# Patient Record
Sex: FEMALE | Race: WHITE | Employment: UNEMPLOYED | URBAN - METROPOLITAN AREA
[De-identification: names, ages, dates, MRNs, and addresses within clinical notes are randomized per-mention and may not be internally consistent; named-entity substitution may affect disease eponyms.]

---

## 2020-08-01 ENCOUNTER — APPOINTMENT (OUTPATIENT)
Dept: GENERAL RADIOLOGY | Age: 50
End: 2020-08-01
Payer: COMMERCIAL

## 2020-08-01 ENCOUNTER — HOSPITAL ENCOUNTER (EMERGENCY)
Age: 50
Discharge: HOME | End: 2020-08-01
Attending: STUDENT IN AN ORGANIZED HEALTH CARE EDUCATION/TRAINING PROGRAM
Payer: COMMERCIAL

## 2020-08-01 VITALS
OXYGEN SATURATION: 100 % | DIASTOLIC BLOOD PRESSURE: 88 MMHG | TEMPERATURE: 99 F | BODY MASS INDEX: 30.12 KG/M2 | SYSTOLIC BLOOD PRESSURE: 122 MMHG | WEIGHT: 170 LBS | HEART RATE: 95 BPM | HEIGHT: 63 IN | RESPIRATION RATE: 19 BRPM

## 2020-08-01 LAB
A/G RATIO: 1.3 (ref 1.1–2.2)
ALBUMIN SERPL-MCNC: 4.4 G/DL (ref 3.4–5)
ALP BLD-CCNC: 108 U/L (ref 40–129)
ALT SERPL-CCNC: 27 U/L (ref 10–40)
ANION GAP SERPL CALCULATED.3IONS-SCNC: 9 MMOL/L (ref 3–16)
AST SERPL-CCNC: 26 U/L (ref 15–37)
BASOPHILS ABSOLUTE: 0.1 K/UL (ref 0–0.2)
BASOPHILS RELATIVE PERCENT: 0.9 %
BILIRUB SERPL-MCNC: <0.2 MG/DL (ref 0–1)
BILIRUBIN URINE: ABNORMAL
BLOOD, URINE: NEGATIVE
BUN BLDV-MCNC: 12 MG/DL (ref 7–20)
CALCIUM SERPL-MCNC: 10.1 MG/DL (ref 8.3–10.6)
CHLORIDE BLD-SCNC: 100 MMOL/L (ref 99–110)
CLARITY: CLEAR
CO2: 28 MMOL/L (ref 21–32)
COLOR: YELLOW
CREAT SERPL-MCNC: 0.9 MG/DL (ref 0.6–1.1)
EKG ATRIAL RATE: 87 BPM
EKG DIAGNOSIS: NORMAL
EKG P AXIS: 47 DEGREES
EKG P-R INTERVAL: 160 MS
EKG Q-T INTERVAL: 370 MS
EKG QRS DURATION: 88 MS
EKG QTC CALCULATION (BAZETT): 445 MS
EKG R AXIS: -9 DEGREES
EKG T AXIS: 26 DEGREES
EKG VENTRICULAR RATE: 87 BPM
EOSINOPHILS ABSOLUTE: 0.1 K/UL (ref 0–0.6)
EOSINOPHILS RELATIVE PERCENT: 1 %
ESTIMATED AVERAGE GLUCOSE: 105.4 MG/DL
GFR AFRICAN AMERICAN: >60
GFR NON-AFRICAN AMERICAN: >60
GLOBULIN: 3.4 G/DL
GLUCOSE BLD-MCNC: 93 MG/DL (ref 70–99)
GLUCOSE URINE: NEGATIVE MG/DL
HBA1C MFR BLD: 5.3 %
HCG QUALITATIVE: NEGATIVE
HCT VFR BLD CALC: 43.6 % (ref 36–48)
HEMOGLOBIN: 14.1 G/DL (ref 12–16)
KETONES, URINE: NEGATIVE MG/DL
LACTATE DEHYDROGENASE: 163 U/L (ref 100–190)
LEUKOCYTE ESTERASE, URINE: NEGATIVE
LYMPHOCYTES ABSOLUTE: 3.1 K/UL (ref 1–5.1)
LYMPHOCYTES RELATIVE PERCENT: 26.7 %
MCH RBC QN AUTO: 29.1 PG (ref 26–34)
MCHC RBC AUTO-ENTMCNC: 32.4 G/DL (ref 31–36)
MCV RBC AUTO: 89.8 FL (ref 80–100)
MICROSCOPIC EXAMINATION: ABNORMAL
MONOCYTES ABSOLUTE: 0.9 K/UL (ref 0–1.3)
MONOCYTES RELATIVE PERCENT: 8 %
NEUTROPHILS ABSOLUTE: 7.3 K/UL (ref 1.7–7.7)
NEUTROPHILS RELATIVE PERCENT: 63.4 %
NITRITE, URINE: NEGATIVE
PDW BLD-RTO: 14.3 % (ref 12.4–15.4)
PH UA: 5.5 (ref 5–8)
PLATELET # BLD: 340 K/UL (ref 135–450)
PMV BLD AUTO: 7.3 FL (ref 5–10.5)
POTASSIUM REFLEX MAGNESIUM: 3.9 MMOL/L (ref 3.5–5.1)
PROTEIN UA: NEGATIVE MG/DL
RBC # BLD: 4.86 M/UL (ref 4–5.2)
SODIUM BLD-SCNC: 137 MMOL/L (ref 136–145)
SPECIFIC GRAVITY UA: >=1.03 (ref 1–1.03)
T3 FREE: 2.5 PG/ML (ref 2.3–4.2)
T4 FREE: 0.6 NG/DL (ref 0.9–1.8)
TOTAL PROTEIN: 7.8 G/DL (ref 6.4–8.2)
TROPONIN: <0.01 NG/ML
TSH REFLEX: 2.55 UIU/ML (ref 0.27–4.2)
URINE REFLEX TO CULTURE: ABNORMAL
URINE TYPE: ABNORMAL
UROBILINOGEN, URINE: 0.2 E.U./DL
WBC # BLD: 11.4 K/UL (ref 4–11)

## 2020-08-01 PROCEDURE — 80053 COMPREHEN METABOLIC PANEL: CPT

## 2020-08-01 PROCEDURE — 71046 X-RAY EXAM CHEST 2 VIEWS: CPT

## 2020-08-01 PROCEDURE — 84443 ASSAY THYROID STIM HORMONE: CPT

## 2020-08-01 PROCEDURE — 93005 ELECTROCARDIOGRAM TRACING: CPT | Performed by: STUDENT IN AN ORGANIZED HEALTH CARE EDUCATION/TRAINING PROGRAM

## 2020-08-01 PROCEDURE — 83036 HEMOGLOBIN GLYCOSYLATED A1C: CPT

## 2020-08-01 PROCEDURE — 99284 EMERGENCY DEPT VISIT MOD MDM: CPT

## 2020-08-01 PROCEDURE — 84439 ASSAY OF FREE THYROXINE: CPT

## 2020-08-01 PROCEDURE — 85025 COMPLETE CBC W/AUTO DIFF WBC: CPT

## 2020-08-01 PROCEDURE — 84484 ASSAY OF TROPONIN QUANT: CPT

## 2020-08-01 PROCEDURE — U0003 INFECTIOUS AGENT DETECTION BY NUCLEIC ACID (DNA OR RNA); SEVERE ACUTE RESPIRATORY SYNDROME CORONAVIRUS 2 (SARS-COV-2) (CORONAVIRUS DISEASE [COVID-19]), AMPLIFIED PROBE TECHNIQUE, MAKING USE OF HIGH THROUGHPUT TECHNOLOGIES AS DESCRIBED BY CMS-2020-01-R: HCPCS

## 2020-08-01 PROCEDURE — 83615 LACTATE (LD) (LDH) ENZYME: CPT

## 2020-08-01 PROCEDURE — 81003 URINALYSIS AUTO W/O SCOPE: CPT

## 2020-08-01 PROCEDURE — 93010 ELECTROCARDIOGRAM REPORT: CPT | Performed by: INTERNAL MEDICINE

## 2020-08-01 PROCEDURE — 84481 FREE ASSAY (FT-3): CPT

## 2020-08-01 PROCEDURE — 84703 CHORIONIC GONADOTROPIN ASSAY: CPT

## 2020-08-01 RX ORDER — GABAPENTIN 300 MG/1
300 CAPSULE ORAL DAILY
COMMUNITY

## 2020-08-01 RX ORDER — LEVOTHYROXINE AND LIOTHYRONINE 57; 13.5 UG/1; UG/1
90 TABLET ORAL DAILY
COMMUNITY

## 2020-08-01 RX ORDER — LEVOTHYROXINE AND LIOTHYRONINE 9.5; 2.25 UG/1; UG/1
15 TABLET ORAL DAILY
COMMUNITY

## 2020-08-01 RX ORDER — NEBIVOLOL 5 MG/1
5 TABLET ORAL NIGHTLY
COMMUNITY

## 2020-08-01 RX ORDER — ARMODAFINIL 200 MG/1
1 TABLET ORAL DAILY
COMMUNITY

## 2020-08-01 RX ORDER — BUSPIRONE HYDROCHLORIDE 15 MG/1
15 TABLET ORAL 2 TIMES DAILY
COMMUNITY

## 2020-08-01 RX ORDER — DULOXETIN HYDROCHLORIDE 60 MG/1
60 CAPSULE, DELAYED RELEASE ORAL NIGHTLY
COMMUNITY

## 2020-08-01 RX ORDER — PITAVASTATIN CALCIUM 4.18 MG/1
4 TABLET, FILM COATED ORAL NIGHTLY
COMMUNITY

## 2020-08-01 RX ORDER — BACLOFEN 20 MG/1
20 TABLET ORAL DAILY
COMMUNITY

## 2020-08-01 RX ORDER — DOXAZOSIN 2 MG/1
2 TABLET ORAL NIGHTLY
COMMUNITY

## 2020-08-01 NOTE — ED PROVIDER NOTES
Primary Care Physician: [address] [place], [place] [address]gage STEWART   Attending Physician: No att. providers found     History   Chief Complaint   Patient presents with    Chills     pt comes in tonight with c/o off and on sweats, sore throat since . pt sts this last for about a week at a time. Pt sts tonight she is starting to feel clammy again and feels like another episode is starting again. FRANCISCO JAVIER Aparicio is a 48 y.o. female with history of hypertension, hyperlipidemia presenting this evening accompanied by daughter with complaint of feeling clammy since  of this year. She states that her symptoms are intermittent comes and goes. States that she has had neck pain in the past but nothing as persistent at this time. Denies fevers, chills, nausea, vomiting, chest pain, shortness of breath or exposures to person infected with coronavirus. Past Medical History:   Diagnosis Date    Hyperlipidemia     Hypertension     Hypothyroidism         Past Surgical History:   Procedure Laterality Date     SECTION      CHOLECYSTECTOMY  2016    CYST REMOVAL Left     TONSILLECTOMY AND ADENOIDECTOMY          Family History   Problem Relation Age of Onset    Heart Disease Mother     COPD Mother     No Known Problems Father         Social History     Socioeconomic History    Marital status:      Spouse name: None    Number of children: None    Years of education: None    Highest education level: None   Occupational History    None   Social Needs    Financial resource strain: None    Food insecurity     Worry: None     Inability: None    Transportation needs     Medical: None     Non-medical: None   Tobacco Use    Smoking status: Never Smoker    Smokeless tobacco: Never Used   Substance and Sexual Activity    Alcohol use:  Yes     Alcohol/week: 0.0 standard drinks     Comment: occasionally    Drug use: Never    Sexual activity: None   Lifestyle    Physical activity     Days per week: None components:    Bilirubin Urine SMALL (*)     All other components within normal limits    Narrative:     Performed at:  [facility],  [place], [place]   Phone [phone]   T4, FREE - Abnormal; Notable for the following components:    T4 Free 0.6 (*)     All other components within normal limits    Narrative:     Performed at:  [facility]  [address] [place], [place] [address]   Phone [phone]   COMPREHENSIVE METABOLIC PANEL W/ REFLEX TO MG FOR LOW K    Narrative:     Performed at:  [facility] [address],  [place], [place]   Phone [phone]   TROPONIN    Narrative:     Performed at:  [facility] [address],  [place], [facility]   Phone [phone]   TSH WITH REFLEX    Narrative:     Performed at:  [place] [address],  [place], [facility]   Phone [phone]   T3, FREE    Narrative:     Performed at:  [facility]  [address] [place], [facility] [place] [address]   Phone [phone]   HCG, SERUM, QUALITATIVE    Narrative:     Performed at:  [facility] [address],  [place], [facility]   Phone [phone]   LACTATE DEHYDROGENASE    Narrative:     Performed at:  [place] [address],  [place], [facility]   Phone [phone]   HEMOGLOBIN A1C    Narrative:     Performed at:  [facility]  [address] [place], [place] [address]   Phone [phone]    Narrative:     Referred out by:  [facility]  [address] [place], [place] [address]   Phone [phone]      XR CHEST (2 VW)   Final Result   No acute findings.             EKG INTERPRETATION:  EKG by my preliminary interpretation shows sinus rhythm with rate of 87, normal axis, normal intervals, with no ST changes indicative of ischemia at this time. PROCEDURES:   Procedures    ASSESSMENT AND PLAN:  Sumeet Maynard is a 48 y.o. female presenting with not feeling well for couple of months. Her exam was unremarkable. Given her presentation I was concerned for possible infection as well as COVID-19. Did obtain labs which showed no abnormal findings. By the time patient was discharged from the emergency room her COVID-19 test was still pending. Given her findings I do not think that her symptoms are secondary to infection. I am concerned that she is probably suffering from some kind of autoimmune disease. At this time there is no indication for admission. She was discharged home to follow-up with her primary care doctor. ClINICAL IMPRESSION:  1. Other fatigue          PATIENT REFERRED TO:  Amanda Andrade [address]  [phone]    Schedule an appointment as soon as possible for a visit in 2 days        DISCHARGE MEDICATIONS:  Discharge Medication List as of 8/1/2020  7:20 AM        DISCONTINUED MEDICATIONS:  Discharge Medication List as of 8/1/2020  7:20 AM      STOP taking these medications       aspirin 81 MG tablet Comments:   Reason for Stopping:         L-Methylfolate 15 MG TABS Comments:   Reason for Stopping:         Rosuvastatin Calcium (CRESTOR PO) Comments:   Reason for Stopping:             DISPOSITION    Discharge home  -We have instructed the patient, Sumeet Maynard) to return to the ED or call her PCP if her pain/symptoms worsen. -Findings and recommendations explained to patient. She expressed understanding and agreed with the plan.   Andrés Loepz MD (electronically signed)  8/4/2020  _________________________________________________________________________________________  _________________________________________________________________________________________  This record is transcribed utilizing voice recognition technology. There are inherent limitations in this technology. In addition, there may be limitations in editing of this report. If there are any discrepancies, please contact me directly.         Suman Sol MD  08/04/20 5803

## 2020-08-02 NOTE — RESULT ENCOUNTER NOTE
She should follow-up with primary care, whoever takes care of her thyroid issues, her free T4 was low, 0.6, not therapeutic on her medicine.

## 2020-08-03 ENCOUNTER — CARE COORDINATION (OUTPATIENT)
Dept: CARE COORDINATION | Age: 50
End: 2020-08-03

## 2020-08-03 LAB — SARS-COV-2, NAA: NOT DETECTED

## 2020-08-03 NOTE — CARE COORDINATION
Chart reviewed. Pt seen and evaluated in ED for fatigue. Pt given the following referrals/recommendations (see below): - Schedule an appointment with Berny STEWART (Family Medicine) in 2 days (8/3/2020)    SARS-CoV-2, RUTH  08/01/2020  4:43 AM  [facility]    Not Detected      Initial ED f/u call to pt, initial home phone disconnected [phone]. Attempted to reach mobile number [phone]- number was pt's spouse Jesusita Redd- due to no valid/visible HIPAA unable to speak with Jesusita Redd. Jesusita Redd provided ACM with pt's correct mobile number [phone]    Outreach call to pt's updated contact number. Unable to reach pt or leave message due to VM being full. Will attempt to reach pt later today. FU appts/Provider:    No future appointments.

## 2020-08-04 NOTE — CARE COORDINATION
Second ED f/u call to pt attempted. Unable to reach pt or leave message due to VM being full. No further outreach at this time.